# Patient Record
Sex: MALE | Race: WHITE | NOT HISPANIC OR LATINO | URBAN - METROPOLITAN AREA
[De-identification: names, ages, dates, MRNs, and addresses within clinical notes are randomized per-mention and may not be internally consistent; named-entity substitution may affect disease eponyms.]

---

## 2023-03-09 ENCOUNTER — APPOINTMENT (OUTPATIENT)
Dept: URBAN - METROPOLITAN AREA SURGERY 17 | Age: 53
Setting detail: DERMATOLOGY
End: 2023-03-10

## 2023-03-09 VITALS
TEMPERATURE: 98.1 F | SYSTOLIC BLOOD PRESSURE: 120 MMHG | RESPIRATION RATE: 16 BRPM | HEART RATE: 80 BPM | DIASTOLIC BLOOD PRESSURE: 84 MMHG

## 2023-03-09 PROBLEM — D04.39 CARCINOMA IN SITU OF SKIN OF OTHER PARTS OF FACE: Status: ACTIVE | Noted: 2023-03-09

## 2023-03-09 PROCEDURE — OTHER MIPS QUALITY: OTHER

## 2023-03-09 PROCEDURE — OTHER MOHS SURGERY: OTHER

## 2023-03-09 PROCEDURE — OTHER CONSULTATION FOR MOHS SURGERY: OTHER

## 2023-03-09 PROCEDURE — 17311 MOHS 1 STAGE H/N/HF/G: CPT

## 2023-03-09 NOTE — PROCEDURE: MOHS SURGERY
Dr. Saloni Silver responded on telestroke. Partial Purse String (Simple) Text: Given the location of the defect and the characteristics of the surrounding skin a simple purse string closure was deemed most appropriate.  Undermining was performed circumferentially around the surgical defect.  A purse string suture was then placed and tightened. Wound tension only allowed a partial closure of the circular defect.

## 2023-03-09 NOTE — PROCEDURE: MOHS SURGERY

## 2023-03-09 NOTE — PROCEDURE: MOHS SURGERY
22-Aug-2018 11:20 Eye Protection Verbiage: Before proceeding with the stage, a plastic scleral shield was inserted. The globe was anesthetized with a few drops of proparacaine. Then, an appropriate sized scleral shield was chosen and coated with lacrilube ointment. The shield was gently inserted and left in place for the duration of each stage. After the stage was completed, the shield was gently removed.

## 2023-03-09 NOTE — PROCEDURE: MOHS SURGERY
Body Location Override (Optional - Billing Will Still Be Based On Selected Body Map Location If Applicable): Left zygoma

## 2023-03-09 NOTE — PROCEDURE: CONSULTATION FOR MOHS SURGERY
Detail Level: Detailed
Body Location Override (Optional - Billing Will Still Be Based On Selected Body Map Location If Applicable): Left zygoma
Size Of Lesion: 0.4
Incorporate Mauc In Note: Yes

## 2023-03-09 NOTE — PROCEDURE: MOHS SURGERY

## 2023-03-09 NOTE — PROCEDURE: MOHS SURGERY
